# Patient Record
Sex: FEMALE | Race: WHITE | NOT HISPANIC OR LATINO | URBAN - METROPOLITAN AREA
[De-identification: names, ages, dates, MRNs, and addresses within clinical notes are randomized per-mention and may not be internally consistent; named-entity substitution may affect disease eponyms.]

---

## 2019-09-04 ENCOUNTER — EMERGENCY (EMERGENCY)
Facility: HOSPITAL | Age: 84
LOS: 0 days | Discharge: HOME | End: 2019-09-05
Attending: EMERGENCY MEDICINE
Payer: MEDICARE

## 2019-09-04 VITALS
SYSTOLIC BLOOD PRESSURE: 153 MMHG | RESPIRATION RATE: 20 BRPM | WEIGHT: 149.91 LBS | OXYGEN SATURATION: 98 % | HEART RATE: 86 BPM | HEIGHT: 64 IN | DIASTOLIC BLOOD PRESSURE: 71 MMHG | TEMPERATURE: 99 F

## 2019-09-04 DIAGNOSIS — M25.473 EFFUSION, UNSPECIFIED ANKLE: ICD-10-CM
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
## 2019-09-04 DIAGNOSIS — S72.114A NONDISPLACED FRACTURE OF GREATER TROCHANTER OF RIGHT FEMUR, INITIAL ENCOUNTER FOR CLOSED FRACTURE: ICD-10-CM

## 2019-09-04 DIAGNOSIS — Z79.01 LONG TERM (CURRENT) USE OF ANTICOAGULANTS: ICD-10-CM

## 2019-09-04 DIAGNOSIS — E11.9 TYPE 2 DIABETES MELLITUS WITHOUT COMPLICATIONS: ICD-10-CM

## 2019-09-04 DIAGNOSIS — M25.551 PAIN IN RIGHT HIP: ICD-10-CM
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
## 2019-09-04 LAB
ALBUMIN SERPL ELPH-MCNC: 3.5 G/DL — SIGNIFICANT CHANGE UP (ref 3.3–5)
ALP SERPL-CCNC: 59 U/L — SIGNIFICANT CHANGE UP (ref 40–120)
ALT FLD-CCNC: 16 U/L — SIGNIFICANT CHANGE UP (ref 12–78)
ANION GAP SERPL CALC-SCNC: 9 MMOL/L — SIGNIFICANT CHANGE UP (ref 5–17)
APTT BLD: 28.6 SEC — SIGNIFICANT CHANGE UP (ref 28.5–37)
AST SERPL-CCNC: 12 U/L — LOW (ref 15–37)
BASOPHILS # BLD AUTO: 0.04 K/UL — SIGNIFICANT CHANGE UP (ref 0–0.2)
BASOPHILS NFR BLD AUTO: 0.5 % — SIGNIFICANT CHANGE UP (ref 0–2)
BILIRUB SERPL-MCNC: 0.6 MG/DL — SIGNIFICANT CHANGE UP (ref 0.2–1.2)
BUN SERPL-MCNC: 54 MG/DL — HIGH (ref 7–23)
CALCIUM SERPL-MCNC: 9.3 MG/DL — SIGNIFICANT CHANGE UP (ref 8.5–10.1)
CHLORIDE SERPL-SCNC: 106 MMOL/L — SIGNIFICANT CHANGE UP (ref 96–108)
CO2 SERPL-SCNC: 24 MMOL/L — SIGNIFICANT CHANGE UP (ref 22–31)
CREAT SERPL-MCNC: 1.34 MG/DL — HIGH (ref 0.5–1.3)
EOSINOPHIL # BLD AUTO: 0.08 K/UL — SIGNIFICANT CHANGE UP (ref 0–0.5)
EOSINOPHIL NFR BLD AUTO: 1 % — SIGNIFICANT CHANGE UP (ref 0–6)
GLUCOSE SERPL-MCNC: 128 MG/DL — HIGH (ref 70–99)
HCT VFR BLD CALC: 38.3 % — SIGNIFICANT CHANGE UP (ref 34.5–45)
HGB BLD-MCNC: 12.3 G/DL — SIGNIFICANT CHANGE UP (ref 11.5–15.5)
IMM GRANULOCYTES NFR BLD AUTO: 0.4 % — SIGNIFICANT CHANGE UP (ref 0–1.5)
INR BLD: 1.01 RATIO — SIGNIFICANT CHANGE UP (ref 0.88–1.16)
LYMPHOCYTES # BLD AUTO: 2.02 K/UL — SIGNIFICANT CHANGE UP (ref 1–3.3)
LYMPHOCYTES # BLD AUTO: 25.1 % — SIGNIFICANT CHANGE UP (ref 13–44)
MCHC RBC-ENTMCNC: 27.8 PG — SIGNIFICANT CHANGE UP (ref 27–34)
MCHC RBC-ENTMCNC: 32.1 GM/DL — SIGNIFICANT CHANGE UP (ref 32–36)
MCV RBC AUTO: 86.7 FL — SIGNIFICANT CHANGE UP (ref 80–100)
MONOCYTES # BLD AUTO: 0.6 K/UL — SIGNIFICANT CHANGE UP (ref 0–0.9)
MONOCYTES NFR BLD AUTO: 7.5 % — SIGNIFICANT CHANGE UP (ref 2–14)
NEUTROPHILS # BLD AUTO: 5.28 K/UL — SIGNIFICANT CHANGE UP (ref 1.8–7.4)
NEUTROPHILS NFR BLD AUTO: 65.5 % — SIGNIFICANT CHANGE UP (ref 43–77)
NRBC # BLD: 0 /100 WBCS — SIGNIFICANT CHANGE UP (ref 0–0)
PLATELET # BLD AUTO: 267 K/UL — SIGNIFICANT CHANGE UP (ref 150–400)
POTASSIUM SERPL-MCNC: 3.7 MMOL/L — SIGNIFICANT CHANGE UP (ref 3.5–5.3)
POTASSIUM SERPL-SCNC: 3.7 MMOL/L — SIGNIFICANT CHANGE UP (ref 3.5–5.3)
PROT SERPL-MCNC: 7.2 GM/DL — SIGNIFICANT CHANGE UP (ref 6–8.3)
PROTHROM AB SERPL-ACNC: 11.3 SEC — SIGNIFICANT CHANGE UP (ref 10–12.9)
RBC # BLD: 4.42 M/UL — SIGNIFICANT CHANGE UP (ref 3.8–5.2)
RBC # FLD: 14.2 % — SIGNIFICANT CHANGE UP (ref 10.3–14.5)
SODIUM SERPL-SCNC: 139 MMOL/L — SIGNIFICANT CHANGE UP (ref 135–145)
WBC # BLD: 8.05 K/UL — SIGNIFICANT CHANGE UP (ref 3.8–10.5)
WBC # FLD AUTO: 8.05 K/UL — SIGNIFICANT CHANGE UP (ref 3.8–10.5)

## 2019-09-04 PROCEDURE — 93970 EXTREMITY STUDY: CPT | Mod: 26

## 2019-09-04 PROCEDURE — 73502 X-RAY EXAM HIP UNI 2-3 VIEWS: CPT | Mod: 26,RT

## 2019-09-04 PROCEDURE — 99284 EMERGENCY DEPT VISIT MOD MDM: CPT

## 2019-09-04 PROCEDURE — 72192 CT PELVIS W/O DYE: CPT | Mod: 26

## 2019-09-04 PROCEDURE — 73700 CT LOWER EXTREMITY W/O DYE: CPT | Mod: 26,RT

## 2019-09-04 PROCEDURE — 73552 X-RAY EXAM OF FEMUR 2/>: CPT | Mod: 26,RT

## 2019-09-04 RX ORDER — FOLIC ACID 0.8 MG
0 TABLET ORAL
Qty: 0 | Refills: 0 | DISCHARGE

## 2019-09-04 RX ORDER — METFORMIN HYDROCHLORIDE 850 MG/1
1 TABLET ORAL
Qty: 0 | Refills: 0 | DISCHARGE

## 2019-09-04 RX ORDER — CLOPIDOGREL BISULFATE 75 MG/1
1 TABLET, FILM COATED ORAL
Qty: 0 | Refills: 0 | DISCHARGE

## 2019-09-04 NOTE — ED PROVIDER NOTE - CLINICAL SUMMARY MEDICAL DECISION MAKING FREE TEXT BOX
hx, exam, us of bilateral legs, ct of pelvis and right hip, Pt's care is signed out to the next team.

## 2019-09-04 NOTE — CONSULT NOTE ADULT - ASSESSMENT
A/P: 89F w/ ND GT Fx  Given patient has been able to ambulate and has no pain with ROM of hip, low suspicion for extension into an IT Fx  Unable to obtain MRI 2/2 to orthopedic hardware  Recommend outpatient follow up   Abduction precautions  Analgesia  DVT ppx  WBAT  PT/OT  Encourage incentive spirometry  No acute orthopedic surgical intervention indicated at this time  Ortho stable  FU outpatient w/ Dr. Braden in 1-2wks or sooner if pain worsens or fails to improve  Discussed with attending Dr. Braden who is aware and in agreement with above plan A/P: 89F w/ ND R GT Fx  Given patient has been able to ambulate and has no pain with ROM of hip, low suspicion for extension into an IT Fx at this time  Unable to obtain MRI 2/2 to orthopedic hardware (i.e. artifact)  Recommend outpatient follow up   Abduction precautions  Analgesia  DVT ppx  WBAT  PT/OT  Encourage incentive spirometry  No acute orthopedic surgical intervention indicated at this time  Ortho stable  FU outpatient w/ Dr. Braden in 1-2wks or sooner if pain worsens or fails to improve  Discussed with attending Dr. Braden who is aware and in agreement with above plan

## 2019-09-04 NOTE — ED PROVIDER NOTE - NONTENDER LOCATION
right costovertebral angle/umbilical/suprapubic/left costovertebral angle/periumbilical/right upper quadrant/right lower quadrant/left upper quadrant/left lower quadrant

## 2019-09-04 NOTE — ED ADULT NURSE NOTE - NSIMPLEMENTINTERV_GEN_ALL_ED
Implemented All Fall with Harm Risk Interventions:  [place] to call system. Call bell, personal items and telephone within reach. Instruct patient to call for assistance. Room bathroom lighting operational. Non-slip footwear when patient is off stretcher. Physically safe environment: no spills, clutter or unnecessary equipment. Stretcher in lowest position, wheels locked, appropriate side rails in place. Provide visual cue, wrist band, yellow gown, etc. Monitor gait and stability. Monitor for mental status changes and reorient to person, place, and time. Review medications for side effects contributing to fall risk. Reinforce activity limits and safety measures with patient and family. Provide visual clues: red socks.

## 2019-09-04 NOTE — ED PROVIDER NOTE - OBJECTIVE STATEMENT
89 years old female with  c/o pain to the right hip and bilateral legs swelling down to the ankle R> L. Pt sts she tripped and fell on right hip 5 days ago was seen at an urgent care had normal xray of right hip. Pt denies  trauma to the head, loc, headache, dizziness, neck/back pain, blurred visions, light sensitivities, focal/distal weakness or numbness, cough, sob, chest pain, nausea, vomiting, fever, chills, abd pain, dysuria, or irregular bowel movements. 89 years old female with  c/o pain to the right hip and bilateral legs swelling down to the ankle R> L. Pt sts she tripped and fell on right hip 5 days ago was seen at an urgent care had normal xray of right hip. Pt denies  trauma to the head, loc, headache, dizziness, neck/back pain, blurred visions, light sensitivities, focal/distal weakness or numbness, cough, sob, chest pain, nausea, vomiting, fever, chills, abd pain, dysuria, or irregular bowel movements. 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

## 2019-09-04 NOTE — ED PROVIDER NOTE - CONSTITUTIONAL, MLM
normal... Well appearing, well nourished, awake, alert, oriented to person, place, time/situation and in no apparent distress. Speaking in clear full sentences no nasal flaring no shoulders retractions no diaphoresis, not holding her head/chest/abdomen, smiling appears very comfortable sitting up in  the stretcher in a bright light room.

## 2019-09-04 NOTE — ED ADULT TRIAGE NOTE - CHIEF COMPLAINT QUOTE
Pt post fall on Friday negative xrays from urgent c/o right hip pain, pt with bilateral leg swelling , has to sit up to sleep and pt is on plavix, NO LOC

## 2019-09-04 NOTE — ED PROVIDER NOTE - CARE PLAN
Principal Discharge DX:	Fracture of hip, right, closed Principal Discharge DX:	Fracture of hip, right, closed  Secondary Diagnosis:	Trochanteric avulsion fracture of femur, right, closed, initial encounter

## 2019-09-04 NOTE — ED PROVIDER NOTE - PATIENT PORTAL LINK FT
You can access the FollowMyHealth Patient Portal offered by [facility] by registering at the following website: [email or web address]. By joining [facility]’s FollowMyHealth portal, you will also be able to view your health information using other applications (apps) compatible with our system.

## 2019-09-04 NOTE — ED ADULT NURSE NOTE - NS ED NURSE DC INFO COMPLEXITY
Patient asked questions/Complex: Multiple Rx/Tx. Pt has difficulty understanding. Requires additional help/Verbalized Understanding

## 2019-09-04 NOTE — ED PROVIDER NOTE - PROGRESS NOTE DETAILS
ortho is notified. ortho was here florentino pt. Pt endorsed by Dr. Hernandez present for eval of R hip fx, pending ortho eval.  No surgical intervention at this time pt clear for dc per ortho.  However pt lives at home, unable to perform ADL without assistance/help, has been using brother's walker.  Plan PT eval.  Patient care transitioned to incoming team.  All decisions regarding the progression of care will be made at their discretion. Pt was cared by Dr. Michael over night. Pt is alert and oriented x 3 denies headache, dizziness, sob, chest pain, nausea, vomiting, fever, chills, abd pain, Pt requires physical therapy and  eval before dispo this morning. Physical therapy sts pt needs out pt PT and social sts pt needs to see her pmd for the arrangement. Pt is very comfortable.

## 2019-09-04 NOTE — CONSULT NOTE ADULT - SUBJECTIVE AND OBJECTIVE BOX
Patient is a 89F community ambulator without assistive devices who presents to [facility] ED w/ a c/o of R hip pain. Patient states she was watching a show at [employer] on Friday when she went to get up from her chair and she fell. Denies HH/LOC. States ability to ambulate on affected extremity immediately following injury albeit with some pain. Admits to hitting her elbow and having a minor superficial abraision but denies any other injuries. Denies having any other pain elsewhere. Today, she reports having some mild discomfort in her R hip and some pain with ambulation. She reports that she started using a walker to ambulate since the initial injury. Denies any other recent injuries or traumas. Admits to previous R Hip CRPP > 30 yrs ago. Denies any other orthopedic history. No other orthopedic concerns at this time.    Home Medications:  folic acid:  (04 Sep 2019 11:46)  metFORMIN 500 mg oral tablet: 1 tab(s) orally once a day (04 Sep 2019 11:46)  Plavix 75 mg oral tablet: 1 tab(s) orally once a day (04 Sep 2019 11:46)    Allergies  No Known Allergies    PAST MEDICAL & SURGICAL HISTORY:  Diabetes    PHYSICAL EXAM:  Vital Signs Last 24 Hrs  T(C): 36.4 (04 Sep 2019 15:53), Max: 37 (04 Sep 2019 11:05)  T(F): 97.6 (04 Sep 2019 15:53), Max: 98.6 (04 Sep 2019 11:05)  HR: 80 (04 Sep 2019 15:53) (80 - 86)  BP: 135/67 (04 Sep 2019 15:53) (135/67 - 153/71)  BP(mean): --  RR: 18 (04 Sep 2019 15:53) (18 - 20)  SpO2: 98% (04 Sep 2019 15:53) (98% - 98%)    Gen: NAD, Resting comfortably  RLE:  Skin intact, large ecchymosis over lateral hip region  No bony tenderness to palpation  +EHL/FHL/TA/GSC  SILT L3-S1  +DP  Unable to SLR 2/2 to pain  No pain with passive ROM of hip  Neg heel strike  Neg log roll  Compartments soft and compressible    Secondary Survey:   LLE/RUE/LUE: + scab over R elbow. No TTP over bony prominences, SILT, palpable pulses, full/painless range of motion, compartments soft    Spine: No bony tenderness. No palpable stepoffs.     CT Pelvis:  Demonstrates non-displaced GT fx. s/p CRPP Patient is a 89F community ambulator without assistive devices who presents to [facility] ED w/ a c/o of R hip pain. Patient states she was watching a show at [employer] on Friday when she went to get up from her chair and she fell. Denies HH/LOC. States ability to ambulate on affected extremity immediately following injury albeit with some pain. Admits to hitting her R elbow and having a minor superficial abraision but denies any other injuries. Denies having any other pain elsewhere. Today, she reports having some mild discomfort in her R hip and some pain with ambulation. She reports that she started using a walker to ambulate since the initial injury. Denies any other recent injuries or traumas. Admits to previous R Hip CRPP > 30 yrs ago. Denies any other orthopedic history. No other orthopedic concerns at this time.    Home Medications:  folic acid:  (04 Sep 2019 11:46)  metFORMIN 500 mg oral tablet: 1 tab(s) orally once a day (04 Sep 2019 11:46)  Plavix 75 mg oral tablet: 1 tab(s) orally once a day (04 Sep 2019 11:46)    Allergies  No Known Allergies    PAST MEDICAL & SURGICAL HISTORY:  Diabetes    PHYSICAL EXAM:  Vital Signs Last 24 Hrs  T(C): 36.4 (04 Sep 2019 15:53), Max: 37 (04 Sep 2019 11:05)  T(F): 97.6 (04 Sep 2019 15:53), Max: 98.6 (04 Sep 2019 11:05)  HR: 80 (04 Sep 2019 15:53) (80 - 86)  BP: 135/67 (04 Sep 2019 15:53) (135/67 - 153/71)  BP(mean): --  RR: 18 (04 Sep 2019 15:53) (18 - 20)  SpO2: 98% (04 Sep 2019 15:53) (98% - 98%)    Gen: NAD, Resting comfortably  RLE:  Skin intact, large ecchymosis over lateral hip region  No bony tenderness to palpation  +EHL/FHL/TA/GSC  SILT L3-S1  +DP  Unable to SLR 2/2 to pain  No pain with passive ROM of hip  Neg heel strike  Neg log roll  Compartments soft and compressible    Secondary Survey:   LLE/RUE/LUE: + scab over R elbow. No TTP over bony prominences, SILT, palpable pulses, full/painless range of motion, compartments soft    Spine: No bony tenderness. No palpable stepoffs.     CT Pelvis:  Demonstrates non-displaced R GT fx. s/p R Hip CRPP

## 2019-09-05 VITALS
TEMPERATURE: 98 F | DIASTOLIC BLOOD PRESSURE: 63 MMHG | OXYGEN SATURATION: 99 % | SYSTOLIC BLOOD PRESSURE: 149 MMHG | HEART RATE: 79 BPM | RESPIRATION RATE: 17 BRPM

## 2019-09-05 LAB — GLUCOSE BLDC GLUCOMTR-MCNC: 193 MG/DL — HIGH (ref 70–99)

## 2019-09-05 RX ORDER — ACETAMINOPHEN 500 MG
975 TABLET ORAL ONCE
Refills: 0 | Status: COMPLETED | OUTPATIENT
Start: 2019-09-05 | End: 2019-09-05

## 2019-09-05 RX ADMIN — Medication 975 MILLIGRAM(S): at 07:59

## 2019-09-05 RX ADMIN — Medication 975 MILLIGRAM(S): at 08:29

## 2019-09-05 NOTE — PHYSICAL THERAPY INITIAL EVALUATION ADULT - TINETTI BALANCE TEST, REHAB EVAL
Sitting Balance - 1/1 , Rises From Chair - 2/2, Attempts to rise - 2/2 , Immediate Standing Balance - 1/2,  Standing Balance - 2/2, Nudged -  1/2, Eyes Closed - 1/1,  Turning 360 Deg -  2/2, Sitting down - 2/2, Balance Score - 14/16

## 2019-09-05 NOTE — PHYSICAL THERAPY INITIAL EVALUATION ADULT - GENERAL OBSERVATIONS, REHAB EVAL
Patient provided with rolling walker for safe ambulation during discharge. Dr. Hernandez and AYALA Lebron made aware.
Chart reviewed. As per ortho, WBAT to RLE and no surgical intervention at this time. Received sitting at transport chair in the ER room, NAD. Alert. Ox3. Able to follow multistep commands/directions.

## 2019-09-05 NOTE — PHYSICAL THERAPY INITIAL EVALUATION ADULT - TINETTI GAIT TEST, REHAB EVAL
Indication of gait -1/1,   Step Length and height -1/2,   Foot Clearance -2/2,   Step Symmetry - 1/1,  Step Continuity -1/1,   Path -1/ 2,   Trunk -1/2,   Walking Time -1/1,   Total Score - 9/12

## 2019-09-05 NOTE — PHYSICAL THERAPY INITIAL EVALUATION ADULT - ACTIVE RANGE OF MOTION EXAMINATION, REHAB EVAL
qamar. upper extremity Active ROM was WNL (within normal limits)/deficits as listed below/bilateral lower extremity Active ROM was WNL (within normal limits)/except R hip limited due to pain and precautions.

## 2019-09-05 NOTE — PHYSICAL THERAPY INITIAL EVALUATION ADULT - RANGE OF MOTION EXAMINATION, REHAB EVAL
except R hip limited due to pain and precautions./deficits as listed below/bilateral upper extremity ROM was WNL (within normal limits)/bilateral lower extremity was ROM was WNL (within normal limits)

## 2019-09-05 NOTE — PHYSICAL THERAPY INITIAL EVALUATION ADULT - ADDITIONAL COMMENTS
Patient lives alone in a pvt house c 3 front entry steps(no rail), has 4 back entrance(she normally use) c L rail up, all amenities on the 1st floor. Independent c all ADL's and ambulation without device. Pt has rollator, rolling walker and transport chair prn.

## 2021-08-18 PROBLEM — Z00.00 ENCOUNTER FOR PREVENTIVE HEALTH EXAMINATION: Status: ACTIVE | Noted: 2021-08-18

## 2023-06-25 ENCOUNTER — OFFICE (OUTPATIENT)
Dept: URBAN - METROPOLITAN AREA CLINIC 88 | Facility: CLINIC | Age: 88
Setting detail: OPHTHALMOLOGY
End: 2023-06-25
Payer: MEDICARE

## 2023-06-25 DIAGNOSIS — E11.3293: ICD-10-CM

## 2023-06-25 DIAGNOSIS — H16.223: ICD-10-CM

## 2023-06-25 DIAGNOSIS — H35.3134: ICD-10-CM

## 2023-06-25 DIAGNOSIS — H54.8: ICD-10-CM

## 2023-06-25 PROCEDURE — 92134 CPTRZ OPH DX IMG PST SGM RTA: CPT | Performed by: SPECIALIST

## 2023-06-25 PROCEDURE — 92002 INTRM OPH EXAM NEW PATIENT: CPT | Performed by: SPECIALIST

## 2023-06-25 ASSESSMENT — TONOMETRY
OD_IOP_MMHG: 11
OS_IOP_MMHG: 12

## 2023-06-25 ASSESSMENT — SUPERFICIAL PUNCTATE KERATITIS (SPK)
OD_SPK: 2+
OS_SPK: 2+

## 2023-06-25 ASSESSMENT — CONFRONTATIONAL VISUAL FIELD TEST (CVF)
OS_FINDINGS: FULL
OD_FINDINGS: FULL

## 2023-11-10 NOTE — PHYSICAL THERAPY INITIAL EVALUATION ADULT - GAIT TRAINING, PT EVAL
No
Pt will improve ambulation to ~ 200 feet Independently using rolling walker within 2 weeks. Pt will negotiate ~ 4 steps c L rail Independently  within 2 weeks.

## 2024-01-17 ENCOUNTER — OFFICE (OUTPATIENT)
Dept: URBAN - METROPOLITAN AREA CLINIC 88 | Facility: CLINIC | Age: 89
Setting detail: OPHTHALMOLOGY
End: 2024-01-17
Payer: MEDICARE

## 2024-01-17 DIAGNOSIS — E11.3293: ICD-10-CM

## 2024-01-17 DIAGNOSIS — H35.3134: ICD-10-CM

## 2024-01-17 DIAGNOSIS — H43.813: ICD-10-CM

## 2024-01-17 PROBLEM — H35.40 PERIPAPILLARY ATROPHY ; RIGHT EYE: Status: ACTIVE | Noted: 2024-01-17

## 2024-01-17 PROCEDURE — 92014 COMPRE OPH EXAM EST PT 1/>: CPT | Performed by: OPHTHALMOLOGY

## 2024-01-17 PROCEDURE — 92134 CPTRZ OPH DX IMG PST SGM RTA: CPT | Performed by: OPHTHALMOLOGY

## 2024-01-17 ASSESSMENT — CONFRONTATIONAL VISUAL FIELD TEST (CVF)
OS_FINDINGS: FULL
OD_FINDINGS: FULL

## 2024-01-17 ASSESSMENT — SUPERFICIAL PUNCTATE KERATITIS (SPK)
OD_SPK: 2+
OS_SPK: 2+